# Patient Record
Sex: FEMALE | Race: OTHER
[De-identification: names, ages, dates, MRNs, and addresses within clinical notes are randomized per-mention and may not be internally consistent; named-entity substitution may affect disease eponyms.]

---

## 2018-05-09 ENCOUNTER — HOSPITAL ENCOUNTER (OUTPATIENT)
Dept: HOSPITAL 63 - RAD | Age: 33
Discharge: HOME | End: 2018-05-09
Payer: OTHER GOVERNMENT

## 2018-05-09 DIAGNOSIS — N97.1: Primary | ICD-10-CM

## 2018-05-09 DIAGNOSIS — N85.4: ICD-10-CM

## 2018-05-09 PROCEDURE — 58340 CATHETER FOR HYSTEROGRAPHY: CPT

## 2018-05-09 PROCEDURE — 74740 X-RAY FEMALE GENITAL TRACT: CPT

## 2018-05-09 NOTE — RAD
EXAM: Hysterosalpingogram.

 

HISTORY: 32-year-old female presents with infertility. The patient reports

a prior hysterosalpingogram performed at an outside facility demonstrating

blocked fallopian tubes.

 

TECHNIQUE: The risks of the procedure were discussed with the patient and 

written and verbal consent was obtained. A timeout was performed. The 

patient was placed in a supine position on the fluoroscopy table and a 

speculum was inserted into the vagina. The cervix was identified and 

cleansed with Betadine. A catheter was advanced into the uterus and water 

subtle contrast was injected. A  image and multiple spot fluoroscopic

images were obtained with the patient in different obliquities. A total of

7 images were obtained for total fluoroscopy time of 0.8 minutes.

 

COMPARISON: None.

 

FINDINGS: The uterus is anteverted. There is a normal uterine 

configuration. There is contrast opacification of both fallopian tubes. 

There is suggestion of trace delayed spillage of contrast into the 

peritoneal cavity from the left fallopian tube. No contrast spillage is 

seen from the right fallopian tube.

 

IMPRESSION:

1. No spillage of contrast from the right fallopian tube and suggestion of

trace delayed spillage of contrast from the left fallopian tube.

2. Anteverted uterus with a normal configuration.

 

Electronically signed by: Malini Singh MD (5/9/2018 4:30 PM) [id number]